# Patient Record
Sex: FEMALE | Race: WHITE | NOT HISPANIC OR LATINO | Employment: OTHER | URBAN - METROPOLITAN AREA
[De-identification: names, ages, dates, MRNs, and addresses within clinical notes are randomized per-mention and may not be internally consistent; named-entity substitution may affect disease eponyms.]

---

## 2023-12-12 ENCOUNTER — OFFICE VISIT (OUTPATIENT)
Dept: DERMATOLOGY | Facility: CLINIC | Age: 73
End: 2023-12-12
Payer: MEDICARE

## 2023-12-12 VITALS — DIASTOLIC BLOOD PRESSURE: 83 MMHG | HEART RATE: 76 BPM | SYSTOLIC BLOOD PRESSURE: 149 MMHG

## 2023-12-12 DIAGNOSIS — D48.5 NEOPLASM OF UNCERTAIN BEHAVIOR OF SKIN: ICD-10-CM

## 2023-12-12 DIAGNOSIS — C44.319 BASAL CELL CARCINOMA (BCC) OF SKIN OF OTHER PART OF FACE: ICD-10-CM

## 2023-12-12 PROCEDURE — 13132 CMPLX RPR F/C/C/M/N/AX/G/H/F: CPT | Performed by: DERMATOLOGY

## 2023-12-12 PROCEDURE — 17311 MOHS 1 STAGE H/N/HF/G: CPT | Performed by: DERMATOLOGY

## 2023-12-12 RX ORDER — SEMAGLUTIDE 1.34 MG/ML
1 INJECTION, SOLUTION SUBCUTANEOUS
COMMUNITY
Start: 2023-11-14

## 2023-12-12 NOTE — PROGRESS NOTES
Mohs Surgery Operative Note    Date of Surgery:  12/12/2023  Surgeon:  Gregg Champion MD  Office Location: [address]  [facility] [address]  Dept: [phone]  Dept Fax: [phone]  Referring Provider: Gregg Champion MD  [address] [place] [address] Dr Daniel King [place], [address] [address]      Assessment/Plan   Pre-procedure:   Obtained informed consent: written from patient  The surgical site was identified and confirmed with the patient.     Intra-operative:   Audible time out called at : 11:44 AM 12/12/23  by: Albertina Morrissey LPN   Verified patient name, birthdate, site, specimen bottle label & requisition.    The planned procedure(s) was again reviewed with the patient. The risks of bleeding, infection, nerve damage and scarring were reviewed. Written authorization was obtained. The patient identity, surgical site, and planned procedure(s) were verified. The provider acted as both surgeon and pathologist.     Neoplasm of uncertain behavior of skin    Related Procedures  Referral to Dermatology - Mohs Surgery    Basal cell carcinoma (BCC) of skin of other part of face  Superior Mid Forehead    Mohs surgery    Consent obtained: written    Universal Protocol:  Procedure explained and questions answered to patient or proxy's satisfaction: Yes    Test results available and properly labeled: Yes    Pathology report reviewed: Yes    External notes reviewed: Yes    Photo or diagram used for site identification: Yes    Site/side marked: Yes    Slide independently reviewed by Mohs surgeon: Yes    Immediately prior to procedure a time out was called: Yes    Patient identity confirmed: verbally with patient  Preparation: Patient was prepped and draped in usual sterile fashion      Anticoagulation:  Was the anticoagulation regimen changed prior to Mohs? No      Anesthesia:  Anesthesia method: local infiltration  Local anesthetic: lidocaine 1% WITH epi (1.5% Lido  with Epi)    Procedure Details:  Biopsy accession number: [id number]  Biopsy date: 09/28/2023.  Frozen section biopsy performed: No    Pre-Op diagnosis: basal cell carcinoma  Pre-Op diagnosis comment: Nodular  Surgery side: midline  Surgical site (from skin exam): Superior Mid Forehead  Pre-operative length (cm): 0.2  Pre-operative width (cm): 0.3  Indications for Mohs surgery: anatomic location where tissue conservation is critical  Previously treated? No      Micrographic Surgery Details:  Post-operative length (cm): 0.7  Post-operative width (cm): 0.7  Number of Mohs stages: 1    Stage 1     Comments: The patient was brought into the operating room and placed in the procedure chair in the appropriate position.  The area positive by previous biopsy was identified and confirmed with the patient. The area of clinically obvious tumor was debulked using a curette and/or scalpel as needed. An incision was made following the Mohs approach through the skin. The specimen was taken to the lab, divided into 2 piece(s) and appropriately chromacoded and processed.           Tumor features identified on Mohs section: no tumor identified      Depth of invasion: subcutaneous fat    Depth of defect: subcutaneous fat    Patient tolerance of procedure: tolerated well, no immediate complications    Reconstruction:  Was the defect reconstructed? Yes    Was reconstruction performed by the same Mohs surgeon? Yes    Setting of reconstruction: outpatient office  When was reconstruction performed? same day  Type of reconstruction: linear  Linear reconstruction: complex  Length of linear repair (cm): 3.4  Subcutaneous Layers (Deep Stitches)   Suture size:  5-0  Suture type:  Monocryl  Stitches:  Buried vertical mattress  Fine/surface layer approximation (top stitches)   Epidermal/Superficial suture size:  5-0  Epidermal/Superficial suture type:  Fast-absorbing gut  Stitches: simple running    Hemostasis achieved with:  electrodesiccation  Outcome: patient tolerated procedure well with no complications    Post-procedure details: sterile dressing applied and wound care instructions given    Dressing type: petrolatum, Telfa pad and pressure dressing      Staff Communication: Dermatology Local Anesthesia: 1.5 % Lidocaine / Epinephrine - Amount: 9cc's        Complex Linear Repair - Wide Undermining:  Given the location and size of the defect, it was determined that a complex layered linear closure was required to restore normal anatomy and function. The repair was considered complex because extensive undermining was required and performed. The amount of undermining performed was greater than the maximum width of the defect as measured perpendicular to the closure line along at least one entire edge of the defect. Standing cutaneous cones were removed using Burow's triangles. The wound edges were brought into close approximation with buried vertical mattress sutures. The remainder of the wound was then closed with epidermal top sutures.      The final repair measured 3.4 cm    Wound care was discussed, and the patient was given written post-operative wound care instructions.      The patient will follow up with Gregg Champion MD as needed for any post operative problems or concerns, and will follow up with their primary dermatologist as scheduled.

## 2023-12-12 NOTE — LETTER
MOH's Provider/Referral Letter Treatment Plan    Patient: Annabel Vasquez   YOB: 1950   Date of Visit: 12/12/2023   MRN: [id number]     Susan Holliday MD  [address] [facility]  [address]  [place] [id number]    Dear Susan Holliday MD,    I had the pleasure of seeing Annabel Vasquez today in consultation at your request for evaluation and treatment of:  1. Neoplasm of uncertain behavior of skin    Related Procedures  Referral to Dermatology - Mohs Surgery    2. Basal cell carcinoma (BCC) of skin of other part of face  Superior Mid Forehead    Mohs surgery    Staff Communication: Dermatology Local Anesthesia: 1.5 % Lidocaine / Epinephrine - Amount: 9cc's      Mohs surgery was indicated because of the nature of the lesion and the need to obtain the highest cure rate.  After informed consent was obtained, the patient underwent the procedure without complication.    The skin cancer was removed, wound care instructions were given and the patient was advised to follow up with you.  I will see the patient post-operatively as indicated.    Thank you very much for your confidence in me and for allowing me to share in the care of this patient.    1. Neoplasm of uncertain behavior of skin    Related Procedures  Referral to Dermatology - Mohs Surgery    2. Basal cell carcinoma (BCC) of skin of other part of face  Superior Mid Forehead  Is a 0.2 x 0.3 cm scar    Mohs surgery    Consent obtained: written    Universal Protocol:  Procedure explained and questions answered to patient or proxy's satisfaction: Yes    Test results available and properly labeled: Yes    Pathology report reviewed: Yes    External notes reviewed: Yes    Photo or diagram used for site identification: Yes    Site/side marked: Yes    Slide independently reviewed by Mohs surgeon: Yes    Immediately prior to procedure a time out was called: Yes    Patient identity confirmed: verbally with patient  Preparation: Patient was prepped and draped  in usual sterile fashion      Anticoagulation:  Was the anticoagulation regimen changed prior to Mohs? No      Anesthesia:  Anesthesia method: local infiltration  Local anesthetic: lidocaine 1% WITH epi (1.5% Lido with Epi)    Procedure Details:  Biopsy accession number: [id number]  Biopsy date: 09/28/2023.  Frozen section biopsy performed: No    Pre-Op diagnosis: basal cell carcinoma  Pre-Op diagnosis comment: Nodular  Surgery side: midline  Surgical site (from skin exam): Superior Mid Forehead  Pre-operative length (cm): 0.2  Pre-operative width (cm): 0.3  Indications for Mohs surgery: anatomic location where tissue conservation is critical  Previously treated? No      Micrographic Surgery Details:  Post-operative length (cm): 0.7  Post-operative width (cm): 0.7  Number of Mohs stages: 1    Stage 1     Comments: The patient was brought into the operating room and placed in the procedure chair in the appropriate position.  The area positive by previous biopsy was identified and confirmed with the patient. The area of clinically obvious tumor was debulked using a curette and/or scalpel as needed. An incision was made following the Mohs approach through the skin. The specimen was taken to the lab, divided into 2 piece(s) and appropriately chromacoded and processed.           Tumor features identified on Mohs section: no tumor identified      Depth of invasion: subcutaneous fat    Depth of defect: subcutaneous fat    Patient tolerance of procedure: tolerated well, no immediate complications    Reconstruction:  Was the defect reconstructed? Yes    Was reconstruction performed by the same Mohs surgeon? Yes    Setting of reconstruction: outpatient office  When was reconstruction performed? same day  Type of reconstruction: linear  Linear reconstruction: complex  Length of linear repair (cm): 3.4  Subcutaneous Layers (Deep Stitches)   Suture size:  5-0  Suture type:  Monocryl  Stitches:  Buried vertical  mattress  Fine/surface layer approximation (top stitches)   Epidermal/Superficial suture size:  5-0  Epidermal/Superficial suture type:  Fast-absorbing gut  Stitches: simple running    Hemostasis achieved with: electrodesiccation  Outcome: patient tolerated procedure well with no complications    Post-procedure details: sterile dressing applied and wound care instructions given    Dressing type: petrolatum, Telfa pad and pressure dressing      Staff Communication: Dermatology Local Anesthesia: 1.5 % Lidocaine / Epinephrine - Amount: 9cc's           Sincerely,       Gregg Champion MD  [facility]

## 2024-11-25 ENCOUNTER — PATIENT MESSAGE (OUTPATIENT)
Dept: INFECTIOUS DISEASES | Facility: CLINIC | Age: 74
End: 2024-11-25
Payer: MEDICARE

## 2025-01-06 ENCOUNTER — APPOINTMENT (OUTPATIENT)
Dept: INFECTIOUS DISEASES | Facility: CLINIC | Age: 75
End: 2025-01-06
Payer: MEDICARE

## 2025-01-06 VITALS
HEART RATE: 83 BPM | HEIGHT: 64 IN | SYSTOLIC BLOOD PRESSURE: 111 MMHG | DIASTOLIC BLOOD PRESSURE: 77 MMHG | TEMPERATURE: 97.7 F

## 2025-01-06 DIAGNOSIS — Z71.84 COUNSELING FOR TRAVEL: Primary | ICD-10-CM

## 2025-01-06 PROCEDURE — 99202U03 TRAVEL CONSULT (U03): Performed by: STUDENT IN AN ORGANIZED HEALTH CARE EDUCATION/TRAINING PROGRAM

## 2025-01-06 RX ORDER — ESTRADIOL 10 UG/1
10 INSERT VAGINAL 2 TIMES WEEKLY
COMMUNITY
Start: 2014-05-09

## 2025-01-06 RX ORDER — HYDROXYZINE HYDROCHLORIDE 25 MG/1
1 TABLET, FILM COATED ORAL NIGHTLY
COMMUNITY
Start: 2024-12-10

## 2025-01-06 RX ORDER — CIPROFLOXACIN 500 MG/1
500 TABLET ORAL 2 TIMES DAILY
Qty: 6 TABLET | Refills: 0 | Status: SHIPPED | OUTPATIENT
Start: 2025-01-06 | End: 2025-01-09

## 2025-01-06 RX ORDER — TIRZEPATIDE 5 MG/.5ML
INJECTION, SOLUTION SUBCUTANEOUS
COMMUNITY

## 2025-01-06 RX ORDER — VIT C/E/ZN/COPPR/LUTEIN/ZEAXAN 250MG-90MG
1000 CAPSULE ORAL
COMMUNITY

## 2025-01-06 RX ORDER — CHOLESTYRAMINE LIGHT 4 G/5.7G
POWDER, FOR SUSPENSION ORAL
COMMUNITY

## 2025-01-06 NOTE — PROGRESS NOTES
Traveling to [place], [place], and [place] from [place]12[place]1/29/25. Has HepA vaccines, oral Typhoid 1/2023, YF, and Tdap 11/2017.    No malaria transmission in the areas she will be. No Malarone prescribed.    No additional vaccines necessary.    Prescribed cipro for Traveler's diarrhea.    Discussed food, water, and insect precautions.